# Patient Record
Sex: MALE | Race: WHITE | Employment: FULL TIME | URBAN - NONMETROPOLITAN AREA
[De-identification: names, ages, dates, MRNs, and addresses within clinical notes are randomized per-mention and may not be internally consistent; named-entity substitution may affect disease eponyms.]

---

## 2021-05-04 ENCOUNTER — HOSPITAL ENCOUNTER (EMERGENCY)
Age: 21
Discharge: HOME | End: 2021-05-04
Attending: EMERGENCY MEDICINE
Payer: COMMERCIAL

## 2021-05-04 VITALS
RESPIRATION RATE: 16 BRPM | TEMPERATURE: 98.9 F | HEIGHT: 71 IN | BODY MASS INDEX: 25.2 KG/M2 | SYSTOLIC BLOOD PRESSURE: 144 MMHG | DIASTOLIC BLOOD PRESSURE: 94 MMHG | HEART RATE: 107 BPM | WEIGHT: 180 LBS | OXYGEN SATURATION: 96 %

## 2021-05-04 DIAGNOSIS — S00.511A ABRASION OF LIP, INITIAL ENCOUNTER: ICD-10-CM
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
## 2021-05-04 DIAGNOSIS — T07.XXXA MULTIPLE BRUISES: ICD-10-CM

## 2021-05-04 PROCEDURE — 74011250636 HC RX REV CODE- 250/636: Performed by: EMERGENCY MEDICINE

## 2021-05-04 PROCEDURE — 90471 IMMUNIZATION ADMIN: CPT

## 2021-05-04 PROCEDURE — 99282 EMERGENCY DEPT VISIT SF MDM: CPT

## 2021-05-04 PROCEDURE — 90715 TDAP VACCINE 7 YRS/> IM: CPT | Performed by: EMERGENCY MEDICINE

## 2021-05-04 RX ORDER — METHYLPHENIDATE HYDROCHLORIDE 54 MG/1
TABLET ORAL
COMMUNITY
Start: 2021-04-06

## 2021-05-04 RX ORDER — AMOXICILLIN 500 MG/1
500 TABLET, FILM COATED ORAL 3 TIMES DAILY
Qty: 15 TAB | Refills: 0 | Status: SHIPPED | OUTPATIENT
Start: 2021-05-04

## 2021-05-04 RX ADMIN — TETANUS TOXOID, REDUCED DIPHTHERIA TOXOID AND ACELLULAR PERTUSSIS VACCINE, ADSORBED 0.5 ML: 5; 2.5; 8; 8; 2.5 SUSPENSION INTRAMUSCULAR at 09:09
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
## 2021-05-04 NOTE — LETTER
[facility] EMERGENCY DEPT 
[facility] [id number] 
[phone] Work/School Note Date: 5/4/2021 To Whom It May concern: 
 
Alverto Maria was seen and treated today in the emergency room by the following provider(s): 
Attending Provider: Tapan Rhodes MD. Alverto Maria is excused from work/school on 05/04/21 and 05/05/21. He is medically clear to return to work/school on 5/6/2021. Sincerely, Samantha Noriega
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
